# Patient Record
Sex: MALE | Race: WHITE | NOT HISPANIC OR LATINO | Employment: UNEMPLOYED | URBAN - NONMETROPOLITAN AREA
[De-identification: names, ages, dates, MRNs, and addresses within clinical notes are randomized per-mention and may not be internally consistent; named-entity substitution may affect disease eponyms.]

---

## 2022-12-22 ENCOUNTER — TELEPHONE (OUTPATIENT)
Dept: CARDIOLOGY | Facility: CLINIC | Age: 32
End: 2022-12-22

## 2022-12-22 NOTE — TELEPHONE ENCOUNTER
Called pt to reschedule the appointment they missed for today and was unable to reach them. The number we had on file was for a different doctor's office.       Called PCP's office, requested a better # for pt as the one we have is for a doctor's office. I informed them that pt no showed appt today and that we were trying to reach him. She stated she does not have a better # for pt. Confirmed that the address we have on file matches the one they have. I advised them that I would just send a no show letter, but to let p[t know if he calls them.     Will send a no show letter to pt and their PCP.

## 2023-02-24 ENCOUNTER — HOSPITAL ENCOUNTER (EMERGENCY)
Facility: HOSPITAL | Age: 33
Discharge: PSYCHIATRIC OR SUBSTANCE USE FACILITY | DRG: 897 | End: 2023-02-24
Attending: EMERGENCY MEDICINE | Admitting: EMERGENCY MEDICINE
Payer: MEDICAID

## 2023-02-24 ENCOUNTER — HOSPITAL ENCOUNTER (INPATIENT)
Facility: HOSPITAL | Age: 33
LOS: 5 days | Discharge: HOME | DRG: 897 | End: 2023-03-01
Attending: PSYCHIATRY & NEUROLOGY | Admitting: PSYCHIATRY & NEUROLOGY
Payer: MEDICAID

## 2023-02-24 VITALS
OXYGEN SATURATION: 98 % | BODY MASS INDEX: 26.51 KG/M2 | HEIGHT: 73 IN | TEMPERATURE: 98.6 F | RESPIRATION RATE: 18 BRPM | WEIGHT: 200 LBS | HEART RATE: 95 BPM | DIASTOLIC BLOOD PRESSURE: 88 MMHG | SYSTOLIC BLOOD PRESSURE: 130 MMHG

## 2023-02-24 DIAGNOSIS — F19.10 SUBSTANCE ABUSE: Primary | ICD-10-CM

## 2023-02-24 DIAGNOSIS — F10.10 ALCOHOL ABUSE: ICD-10-CM

## 2023-02-24 LAB
ALBUMIN SERPL-MCNC: 3.6 G/DL (ref 3.5–5.2)
ALBUMIN/GLOB SERPL: 0.7 G/DL
ALP SERPL-CCNC: 97 U/L (ref 39–117)
ALT SERPL W P-5'-P-CCNC: 44 U/L (ref 1–41)
AMPHET+METHAMPHET UR QL: POSITIVE
AMPHETAMINES UR QL: POSITIVE
ANION GAP SERPL CALCULATED.3IONS-SCNC: 10.7 MMOL/L (ref 5–15)
AST SERPL-CCNC: 64 U/L (ref 1–40)
BARBITURATES UR QL SCN: NEGATIVE
BASOPHILS # BLD AUTO: 0.02 10*3/MM3 (ref 0–0.2)
BASOPHILS NFR BLD AUTO: 0.6 % (ref 0–1.5)
BENZODIAZ UR QL SCN: NEGATIVE
BILIRUB SERPL-MCNC: 0.4 MG/DL (ref 0–1.2)
BILIRUB UR QL STRIP: NEGATIVE
BUN SERPL-MCNC: 12 MG/DL (ref 6–20)
BUN/CREAT SERPL: 18.8 (ref 7–25)
BUPRENORPHINE SERPL-MCNC: NEGATIVE NG/ML
CALCIUM SPEC-SCNC: 9.8 MG/DL (ref 8.6–10.5)
CANNABINOIDS SERPL QL: NEGATIVE
CHLORIDE SERPL-SCNC: 101 MMOL/L (ref 98–107)
CLARITY UR: CLEAR
CO2 SERPL-SCNC: 19.3 MMOL/L (ref 22–29)
COCAINE UR QL: NEGATIVE
COLOR UR: YELLOW
CREAT SERPL-MCNC: 0.64 MG/DL (ref 0.76–1.27)
DEPRECATED RDW RBC AUTO: 41.3 FL (ref 37–54)
EGFRCR SERPLBLD CKD-EPI 2021: 128.2 ML/MIN/1.73
EOSINOPHIL # BLD AUTO: 0.15 10*3/MM3 (ref 0–0.4)
EOSINOPHIL NFR BLD AUTO: 4.1 % (ref 0.3–6.2)
ERYTHROCYTE [DISTWIDTH] IN BLOOD BY AUTOMATED COUNT: 12.5 % (ref 12.3–15.4)
ETHANOL BLD-MCNC: <10 MG/DL (ref 0–10)
ETHANOL UR QL: <0.01 %
FLUAV RNA RESP QL NAA+PROBE: NOT DETECTED
FLUBV RNA RESP QL NAA+PROBE: NOT DETECTED
GLOBULIN UR ELPH-MCNC: 5 GM/DL
GLUCOSE SERPL-MCNC: 112 MG/DL (ref 65–99)
GLUCOSE UR STRIP-MCNC: NEGATIVE MG/DL
HCT VFR BLD AUTO: 45.9 % (ref 37.5–51)
HGB BLD-MCNC: 14.8 G/DL (ref 13–17.7)
HGB UR QL STRIP.AUTO: NEGATIVE
IMM GRANULOCYTES # BLD AUTO: 0.01 10*3/MM3 (ref 0–0.05)
IMM GRANULOCYTES NFR BLD AUTO: 0.3 % (ref 0–0.5)
KETONES UR QL STRIP: NEGATIVE
LEUKOCYTE ESTERASE UR QL STRIP.AUTO: NEGATIVE
LYMPHOCYTES # BLD AUTO: 1.07 10*3/MM3 (ref 0.7–3.1)
LYMPHOCYTES NFR BLD AUTO: 29.6 % (ref 19.6–45.3)
MAGNESIUM SERPL-MCNC: 2 MG/DL (ref 1.6–2.6)
MCH RBC QN AUTO: 29 PG (ref 26.6–33)
MCHC RBC AUTO-ENTMCNC: 32.2 G/DL (ref 31.5–35.7)
MCV RBC AUTO: 90 FL (ref 79–97)
METHADONE UR QL SCN: NEGATIVE
MONOCYTES # BLD AUTO: 0.26 10*3/MM3 (ref 0.1–0.9)
MONOCYTES NFR BLD AUTO: 7.2 % (ref 5–12)
NEUTROPHILS NFR BLD AUTO: 2.11 10*3/MM3 (ref 1.7–7)
NEUTROPHILS NFR BLD AUTO: 58.2 % (ref 42.7–76)
NITRITE UR QL STRIP: NEGATIVE
NRBC BLD AUTO-RTO: 0 /100 WBC (ref 0–0.2)
OPIATES UR QL: NEGATIVE
OXYCODONE UR QL SCN: NEGATIVE
PCP UR QL SCN: NEGATIVE
PH UR STRIP.AUTO: 8.5 [PH] (ref 5–8)
PLATELET # BLD AUTO: 202 10*3/MM3 (ref 140–450)
PMV BLD AUTO: 9.7 FL (ref 6–12)
POTASSIUM SERPL-SCNC: 5.1 MMOL/L (ref 3.5–5.2)
PROPOXYPH UR QL: NEGATIVE
PROT SERPL-MCNC: 8.6 G/DL (ref 6–8.5)
PROT UR QL STRIP: NEGATIVE
RBC # BLD AUTO: 5.1 10*6/MM3 (ref 4.14–5.8)
SARS-COV-2 RNA RESP QL NAA+PROBE: NOT DETECTED
SODIUM SERPL-SCNC: 131 MMOL/L (ref 136–145)
SP GR UR STRIP: 1.01 (ref 1–1.03)
TRICYCLICS UR QL SCN: NEGATIVE
UROBILINOGEN UR QL STRIP: ABNORMAL
WBC NRBC COR # BLD: 3.62 10*3/MM3 (ref 3.4–10.8)

## 2023-02-24 PROCEDURE — 93005 ELECTROCARDIOGRAM TRACING: CPT | Performed by: PSYCHIATRY & NEUROLOGY

## 2023-02-24 PROCEDURE — 80306 DRUG TEST PRSMV INSTRMNT: CPT | Performed by: EMERGENCY MEDICINE

## 2023-02-24 PROCEDURE — 87636 SARSCOV2 & INF A&B AMP PRB: CPT | Performed by: EMERGENCY MEDICINE

## 2023-02-24 PROCEDURE — 83735 ASSAY OF MAGNESIUM: CPT | Performed by: EMERGENCY MEDICINE

## 2023-02-24 PROCEDURE — 81003 URINALYSIS AUTO W/O SCOPE: CPT | Performed by: EMERGENCY MEDICINE

## 2023-02-24 PROCEDURE — 99285 EMERGENCY DEPT VISIT HI MDM: CPT

## 2023-02-24 PROCEDURE — 36415 COLL VENOUS BLD VENIPUNCTURE: CPT

## 2023-02-24 PROCEDURE — 63710000001 ONDANSETRON PER 8 MG: Performed by: PSYCHIATRY & NEUROLOGY

## 2023-02-24 PROCEDURE — 82077 ASSAY SPEC XCP UR&BREATH IA: CPT | Performed by: EMERGENCY MEDICINE

## 2023-02-24 PROCEDURE — 93010 ELECTROCARDIOGRAM REPORT: CPT | Performed by: INTERNAL MEDICINE

## 2023-02-24 PROCEDURE — 85025 COMPLETE CBC W/AUTO DIFF WBC: CPT | Performed by: EMERGENCY MEDICINE

## 2023-02-24 PROCEDURE — C9803 HOPD COVID-19 SPEC COLLECT: HCPCS

## 2023-02-24 PROCEDURE — 80053 COMPREHEN METABOLIC PANEL: CPT | Performed by: EMERGENCY MEDICINE

## 2023-02-24 RX ORDER — LORAZEPAM 0.5 MG/1
0.5 TABLET ORAL EVERY 4 HOURS PRN
Status: ACTIVE | OUTPATIENT
Start: 2023-02-28 | End: 2023-03-01

## 2023-02-24 RX ORDER — MULTIPLE VITAMINS W/ MINERALS TAB 9MG-400MCG
1 TAB ORAL DAILY
Status: DISCONTINUED | OUTPATIENT
Start: 2023-02-24 | End: 2023-03-01 | Stop reason: HOSPADM

## 2023-02-24 RX ORDER — LORAZEPAM 2 MG/1
2 TABLET ORAL EVERY 4 HOURS PRN
Status: ACTIVE | OUTPATIENT
Start: 2023-02-25 | End: 2023-02-26

## 2023-02-24 RX ORDER — ECHINACEA PURPUREA EXTRACT 125 MG
2 TABLET ORAL AS NEEDED
Status: DISCONTINUED | OUTPATIENT
Start: 2023-02-24 | End: 2023-03-01 | Stop reason: HOSPADM

## 2023-02-24 RX ORDER — FAMOTIDINE 20 MG/1
20 TABLET, FILM COATED ORAL 2 TIMES DAILY PRN
Status: DISCONTINUED | OUTPATIENT
Start: 2023-02-24 | End: 2023-03-01 | Stop reason: HOSPADM

## 2023-02-24 RX ORDER — LORAZEPAM 1 MG/1
1 TABLET ORAL
Status: COMPLETED | OUTPATIENT
Start: 2023-02-27 | End: 2023-02-27

## 2023-02-24 RX ORDER — HYDROXYZINE HYDROCHLORIDE 25 MG/1
50 TABLET, FILM COATED ORAL EVERY 6 HOURS PRN
Status: DISCONTINUED | OUTPATIENT
Start: 2023-02-24 | End: 2023-03-01 | Stop reason: HOSPADM

## 2023-02-24 RX ORDER — HYDRALAZINE HYDROCHLORIDE 25 MG/1
25 TABLET, FILM COATED ORAL DAILY PRN
Status: DISCONTINUED | OUTPATIENT
Start: 2023-02-24 | End: 2023-03-01 | Stop reason: HOSPADM

## 2023-02-24 RX ORDER — CLONIDINE HYDROCHLORIDE 0.1 MG/1
0.1 TABLET ORAL 2 TIMES DAILY PRN
Status: ACTIVE | OUTPATIENT
Start: 2023-02-27 | End: 2023-02-28

## 2023-02-24 RX ORDER — CLONIDINE HYDROCHLORIDE 0.1 MG/1
0.1 TABLET ORAL 4 TIMES DAILY PRN
Status: DISPENSED | OUTPATIENT
Start: 2023-02-24 | End: 2023-02-25

## 2023-02-24 RX ORDER — QUETIAPINE FUMARATE 100 MG/1
100 TABLET, FILM COATED ORAL NIGHTLY
Status: DISCONTINUED | OUTPATIENT
Start: 2023-02-24 | End: 2023-03-01 | Stop reason: HOSPADM

## 2023-02-24 RX ORDER — DICYCLOMINE HYDROCHLORIDE 10 MG/1
10 CAPSULE ORAL 3 TIMES DAILY PRN
Status: DISCONTINUED | OUTPATIENT
Start: 2023-02-24 | End: 2023-03-01 | Stop reason: HOSPADM

## 2023-02-24 RX ORDER — CYCLOBENZAPRINE HCL 10 MG
10 TABLET ORAL 3 TIMES DAILY PRN
Status: DISCONTINUED | OUTPATIENT
Start: 2023-02-24 | End: 2023-03-01 | Stop reason: HOSPADM

## 2023-02-24 RX ORDER — BENZTROPINE MESYLATE 1 MG/1
2 TABLET ORAL ONCE AS NEEDED
Status: DISCONTINUED | OUTPATIENT
Start: 2023-02-24 | End: 2023-03-01 | Stop reason: HOSPADM

## 2023-02-24 RX ORDER — LORAZEPAM 1 MG/1
1 TABLET ORAL EVERY 4 HOURS PRN
Status: ACTIVE | OUTPATIENT
Start: 2023-02-27 | End: 2023-02-28

## 2023-02-24 RX ORDER — BENZONATATE 100 MG/1
100 CAPSULE ORAL 3 TIMES DAILY PRN
Status: DISCONTINUED | OUTPATIENT
Start: 2023-02-24 | End: 2023-03-01 | Stop reason: HOSPADM

## 2023-02-24 RX ORDER — NICOTINE 21 MG/24HR
1 PATCH, TRANSDERMAL 24 HOURS TRANSDERMAL
Status: DISCONTINUED | OUTPATIENT
Start: 2023-02-24 | End: 2023-03-01 | Stop reason: HOSPADM

## 2023-02-24 RX ORDER — LORAZEPAM 0.5 MG/1
0.5 TABLET ORAL
Status: COMPLETED | OUTPATIENT
Start: 2023-02-28 | End: 2023-02-28

## 2023-02-24 RX ORDER — MULTIVITAMIN WITH IRON
2 TABLET ORAL DAILY
Status: DISCONTINUED | OUTPATIENT
Start: 2023-02-24 | End: 2023-03-01 | Stop reason: HOSPADM

## 2023-02-24 RX ORDER — ALUMINA, MAGNESIA, AND SIMETHICONE 2400; 2400; 240 MG/30ML; MG/30ML; MG/30ML
15 SUSPENSION ORAL EVERY 6 HOURS PRN
Status: DISCONTINUED | OUTPATIENT
Start: 2023-02-24 | End: 2023-03-01 | Stop reason: HOSPADM

## 2023-02-24 RX ORDER — CLONIDINE HYDROCHLORIDE 0.1 MG/1
0.1 TABLET ORAL ONCE AS NEEDED
Status: ACTIVE | OUTPATIENT
Start: 2023-02-28 | End: 2023-03-01

## 2023-02-24 RX ORDER — ONDANSETRON 4 MG/1
4 TABLET, FILM COATED ORAL EVERY 6 HOURS PRN
Status: DISCONTINUED | OUTPATIENT
Start: 2023-02-24 | End: 2023-03-01 | Stop reason: HOSPADM

## 2023-02-24 RX ORDER — LOPERAMIDE HYDROCHLORIDE 2 MG/1
2 CAPSULE ORAL
Status: DISCONTINUED | OUTPATIENT
Start: 2023-02-24 | End: 2023-03-01 | Stop reason: HOSPADM

## 2023-02-24 RX ORDER — TRAZODONE HYDROCHLORIDE 50 MG/1
50 TABLET ORAL NIGHTLY PRN
Status: DISCONTINUED | OUTPATIENT
Start: 2023-02-24 | End: 2023-03-01 | Stop reason: HOSPADM

## 2023-02-24 RX ORDER — QUETIAPINE FUMARATE 50 MG/1
100 TABLET, FILM COATED ORAL NIGHTLY
COMMUNITY

## 2023-02-24 RX ORDER — LORAZEPAM 2 MG/1
2 TABLET ORAL
Status: COMPLETED | OUTPATIENT
Start: 2023-02-25 | End: 2023-02-25

## 2023-02-24 RX ORDER — LAMOTRIGINE 25 MG/1
25 TABLET ORAL DAILY
COMMUNITY

## 2023-02-24 RX ORDER — IBUPROFEN 400 MG/1
400 TABLET ORAL EVERY 6 HOURS PRN
Status: DISCONTINUED | OUTPATIENT
Start: 2023-02-24 | End: 2023-03-01 | Stop reason: HOSPADM

## 2023-02-24 RX ORDER — VELPATASVIR AND SOFOSBUVIR 100; 400 MG/1; MG/1
1 TABLET, FILM COATED ORAL DAILY
COMMUNITY
Start: 2023-01-27

## 2023-02-24 RX ORDER — BENZTROPINE MESYLATE 1 MG/ML
1 INJECTION INTRAMUSCULAR; INTRAVENOUS ONCE AS NEEDED
Status: DISCONTINUED | OUTPATIENT
Start: 2023-02-24 | End: 2023-03-01 | Stop reason: HOSPADM

## 2023-02-24 RX ORDER — LAMOTRIGINE 100 MG/1
25 TABLET ORAL DAILY
Status: DISCONTINUED | OUTPATIENT
Start: 2023-02-24 | End: 2023-03-01 | Stop reason: HOSPADM

## 2023-02-24 RX ORDER — CLONIDINE HYDROCHLORIDE 0.1 MG/1
0.1 TABLET ORAL 4 TIMES DAILY PRN
Status: ACTIVE | OUTPATIENT
Start: 2023-02-25 | End: 2023-02-26

## 2023-02-24 RX ORDER — LORAZEPAM 2 MG/1
2 TABLET ORAL
Status: DISPENSED | OUTPATIENT
Start: 2023-02-24 | End: 2023-02-25

## 2023-02-24 RX ORDER — CLONIDINE HYDROCHLORIDE 0.1 MG/1
0.1 TABLET ORAL 3 TIMES DAILY PRN
Status: ACTIVE | OUTPATIENT
Start: 2023-02-26 | End: 2023-02-27

## 2023-02-24 RX ADMIN — DICYCLOMINE HYDROCHLORIDE 10 MG: 10 CAPSULE ORAL at 16:33

## 2023-02-24 RX ADMIN — ONDANSETRON HYDROCHLORIDE 4 MG: 4 TABLET, FILM COATED ORAL at 16:33

## 2023-02-24 RX ADMIN — Medication 2 TABLET: at 16:34

## 2023-02-24 RX ADMIN — LORAZEPAM 2 MG: 2 TABLET ORAL at 16:34

## 2023-02-24 RX ADMIN — Medication 100 MG: at 16:33

## 2023-02-24 RX ADMIN — LAMOTRIGINE 25 MG: 100 TABLET ORAL at 17:52

## 2023-02-24 RX ADMIN — HYDROXYZINE HYDROCHLORIDE 50 MG: 25 TABLET ORAL at 16:33

## 2023-02-24 RX ADMIN — Medication 1 TABLET: at 17:52

## 2023-02-24 RX ADMIN — CYCLOBENZAPRINE 10 MG: 10 TABLET, FILM COATED ORAL at 16:33

## 2023-02-24 RX ADMIN — IBUPROFEN 400 MG: 400 TABLET, FILM COATED ORAL at 16:33

## 2023-02-24 RX ADMIN — CLONIDINE HYDROCHLORIDE 0.1 MG: 0.1 TABLET ORAL at 16:33

## 2023-02-25 PROBLEM — F15.20: Status: ACTIVE | Noted: 2023-02-25

## 2023-02-25 PROBLEM — F10.20 ALCOHOL USE DISORDER, SEVERE, DEPENDENCE: Status: ACTIVE | Noted: 2023-02-25

## 2023-02-25 PROCEDURE — 99223 1ST HOSP IP/OBS HIGH 75: CPT | Performed by: PSYCHIATRY & NEUROLOGY

## 2023-02-25 PROCEDURE — 63710000001 ONDANSETRON PER 8 MG: Performed by: PSYCHIATRY & NEUROLOGY

## 2023-02-25 RX ADMIN — LAMOTRIGINE 25 MG: 100 TABLET ORAL at 10:52

## 2023-02-25 RX ADMIN — LORAZEPAM 2 MG: 2 TABLET ORAL at 14:49

## 2023-02-25 RX ADMIN — QUETIAPINE FUMARATE 100 MG: 100 TABLET ORAL at 21:26

## 2023-02-25 RX ADMIN — DICYCLOMINE HYDROCHLORIDE 10 MG: 10 CAPSULE ORAL at 09:15

## 2023-02-25 RX ADMIN — Medication 100 MG: at 09:15

## 2023-02-25 RX ADMIN — ONDANSETRON HYDROCHLORIDE 4 MG: 4 TABLET, FILM COATED ORAL at 09:16

## 2023-02-25 RX ADMIN — Medication 1 TABLET: at 10:53

## 2023-02-25 RX ADMIN — LORAZEPAM 2 MG: 2 TABLET ORAL at 21:26

## 2023-02-25 RX ADMIN — IBUPROFEN 400 MG: 400 TABLET, FILM COATED ORAL at 09:15

## 2023-02-25 RX ADMIN — CYCLOBENZAPRINE 10 MG: 10 TABLET, FILM COATED ORAL at 09:15

## 2023-02-25 RX ADMIN — LORAZEPAM 2 MG: 2 TABLET ORAL at 09:15

## 2023-02-25 RX ADMIN — Medication 2 TABLET: at 09:16

## 2023-02-26 LAB
QT INTERVAL: 400 MS
QTC INTERVAL: 428 MS

## 2023-02-26 PROCEDURE — 99232 SBSQ HOSP IP/OBS MODERATE 35: CPT | Performed by: PSYCHIATRY & NEUROLOGY

## 2023-02-26 RX ADMIN — Medication 100 MG: at 08:29

## 2023-02-26 RX ADMIN — LORAZEPAM 1.5 MG: 1 TABLET ORAL at 08:29

## 2023-02-26 RX ADMIN — HYDROXYZINE HYDROCHLORIDE 50 MG: 25 TABLET ORAL at 08:29

## 2023-02-26 RX ADMIN — LAMOTRIGINE 25 MG: 100 TABLET ORAL at 08:29

## 2023-02-26 RX ADMIN — CYCLOBENZAPRINE 10 MG: 10 TABLET, FILM COATED ORAL at 22:16

## 2023-02-26 RX ADMIN — QUETIAPINE FUMARATE 100 MG: 100 TABLET ORAL at 22:16

## 2023-02-26 RX ADMIN — LORAZEPAM 1.5 MG: 1 TABLET ORAL at 22:16

## 2023-02-26 RX ADMIN — Medication 2 TABLET: at 08:29

## 2023-02-26 RX ADMIN — LORAZEPAM 1.5 MG: 1 TABLET ORAL at 14:18

## 2023-02-26 RX ADMIN — Medication 1 TABLET: at 08:29

## 2023-02-27 PROBLEM — F11.20 OPIOID USE DISORDER, SEVERE, DEPENDENCE: Status: ACTIVE | Noted: 2023-02-27

## 2023-02-27 PROCEDURE — 99232 SBSQ HOSP IP/OBS MODERATE 35: CPT | Performed by: PSYCHIATRY & NEUROLOGY

## 2023-02-27 RX ORDER — BUPRENORPHINE 2 MG/1
2 TABLET SUBLINGUAL DAILY
Status: DISCONTINUED | OUTPATIENT
Start: 2023-03-01 | End: 2023-02-28

## 2023-02-27 RX ORDER — BUPRENORPHINE 2 MG/1
2 TABLET SUBLINGUAL 2 TIMES DAILY
Status: COMPLETED | OUTPATIENT
Start: 2023-02-28 | End: 2023-02-28

## 2023-02-27 RX ORDER — BUPRENORPHINE 2 MG/1
2 TABLET SUBLINGUAL 2 TIMES DAILY
Status: COMPLETED | OUTPATIENT
Start: 2023-02-27 | End: 2023-02-27

## 2023-02-27 RX ADMIN — BUPRENORPHINE HCL 2 MG: 2 TABLET SUBLINGUAL at 15:06

## 2023-02-27 RX ADMIN — HYDROXYZINE HYDROCHLORIDE 50 MG: 25 TABLET ORAL at 08:35

## 2023-02-27 RX ADMIN — LORAZEPAM 1 MG: 1 TABLET ORAL at 21:10

## 2023-02-27 RX ADMIN — LORAZEPAM 1 MG: 1 TABLET ORAL at 15:06

## 2023-02-27 RX ADMIN — Medication 2 TABLET: at 08:35

## 2023-02-27 RX ADMIN — LAMOTRIGINE 25 MG: 100 TABLET ORAL at 08:35

## 2023-02-27 RX ADMIN — LORAZEPAM 1 MG: 1 TABLET ORAL at 08:35

## 2023-02-27 RX ADMIN — CYCLOBENZAPRINE 10 MG: 10 TABLET, FILM COATED ORAL at 08:35

## 2023-02-27 RX ADMIN — QUETIAPINE FUMARATE 100 MG: 100 TABLET ORAL at 21:10

## 2023-02-27 RX ADMIN — Medication 100 MG: at 08:35

## 2023-02-27 RX ADMIN — Medication 1 TABLET: at 08:35

## 2023-02-27 RX ADMIN — BUPRENORPHINE HCL 2 MG: 2 TABLET SUBLINGUAL at 21:10

## 2023-02-28 PROCEDURE — 99232 SBSQ HOSP IP/OBS MODERATE 35: CPT | Performed by: PSYCHIATRY & NEUROLOGY

## 2023-02-28 RX ADMIN — LORAZEPAM 0.5 MG: 0.5 TABLET ORAL at 14:17

## 2023-02-28 RX ADMIN — LORAZEPAM 0.5 MG: 0.5 TABLET ORAL at 08:31

## 2023-02-28 RX ADMIN — CYCLOBENZAPRINE 10 MG: 10 TABLET, FILM COATED ORAL at 14:19

## 2023-02-28 RX ADMIN — Medication 2 TABLET: at 08:29

## 2023-02-28 RX ADMIN — LAMOTRIGINE 25 MG: 100 TABLET ORAL at 08:29

## 2023-02-28 RX ADMIN — BUPRENORPHINE HCL 2 MG: 2 TABLET SUBLINGUAL at 21:38

## 2023-02-28 RX ADMIN — BUPRENORPHINE HCL 2 MG: 2 TABLET SUBLINGUAL at 08:29

## 2023-02-28 RX ADMIN — QUETIAPINE FUMARATE 100 MG: 100 TABLET ORAL at 21:38

## 2023-02-28 RX ADMIN — LORAZEPAM 0.5 MG: 0.5 TABLET ORAL at 21:38

## 2023-02-28 RX ADMIN — Medication 1 TABLET: at 08:29

## 2023-02-28 RX ADMIN — HYDROXYZINE HYDROCHLORIDE 50 MG: 25 TABLET ORAL at 14:19

## 2023-02-28 RX ADMIN — IBUPROFEN 400 MG: 400 TABLET, FILM COATED ORAL at 14:19

## 2023-02-28 RX ADMIN — Medication 100 MG: at 08:29

## 2023-03-01 VITALS
OXYGEN SATURATION: 99 % | TEMPERATURE: 97.6 F | DIASTOLIC BLOOD PRESSURE: 56 MMHG | WEIGHT: 192.4 LBS | HEIGHT: 73 IN | BODY MASS INDEX: 25.5 KG/M2 | RESPIRATION RATE: 18 BRPM | SYSTOLIC BLOOD PRESSURE: 91 MMHG | HEART RATE: 88 BPM

## 2023-03-01 PROCEDURE — 99238 HOSP IP/OBS DSCHRG MGMT 30/<: CPT | Performed by: PSYCHIATRY & NEUROLOGY

## 2023-03-01 RX ADMIN — HYDROXYZINE HYDROCHLORIDE 50 MG: 25 TABLET ORAL at 08:40

## 2023-03-01 RX ADMIN — Medication 2 TABLET: at 08:40

## 2023-03-01 RX ADMIN — CYCLOBENZAPRINE 10 MG: 10 TABLET, FILM COATED ORAL at 08:40

## 2023-03-01 RX ADMIN — LAMOTRIGINE 25 MG: 100 TABLET ORAL at 08:40

## 2023-03-01 RX ADMIN — Medication 100 MG: at 08:40

## 2023-03-01 RX ADMIN — Medication 1 TABLET: at 08:39

## 2023-03-01 NOTE — DISCHARGE SUMMARY
":  1990  MRN:  [id number]  Visit Number:  [id number]      Date of Admission:2023   Date of Discharge:  3/1/2023    Discharge Diagnosis:  Principal Problem:    Alcohol use disorder, severe, dependence (HCC)  Active Problems:    Severe methamphetamine use disorder (HCC)    Opioid use disorder, severe, dependence (HCC)    Mood disorder (HCC)    HIV (human immunodeficiency virus infection) (HCC)    Hepatitis C        Admission Diagnosis:  Polysubstance abuse (HCC) [F19.10]     HPI  Mario Vergara is a 32 y.o. male who was admitted on 2023 with complaints of drug use and withdrawals.  For details please see H&P dated 23.    Hospital Course  Patient is a 32 y.o. male presented with polysubstance use including alcohol, methamphetamine and opioids. The patient was admitted to the detox recovery unit and started on Ativan detox along with clonidine detox. He reported ongoing opioid withdrawals and a short Subutex detox was added.   The patient was continued on his home medications. He reported that he had a diagnosis of HIV and Hep C and was in treatment. No pharmacy record could be found but he reported he was prescribed Biktarvy and last prescription was two months ago in [place] but he didn't pick it up. He was encouraged to resume the medication along with his Hep C treatment and continue his clinic follow-ups. He was able to complete the detox without any adverse events and was discharged back to rehab on 3/1/23.       Mental Status Exam upon discharge:   Mood \"anxious\"   Affect-congruent, appropriate, stable  Thought Content-goal directed, no delusional material present  Thought process-linear, organized.  Suicidality: No SI  Homicidality: No HI  Perception: No AH/VH    Procedures Performed         Consults:   Consults     No orders found from 2023 to 2023.          Pertinent Test Results:   Admission on 2023   Component Date Value Ref Range Status   • QT Interval 2023 400  " ms Final   • QTC Interval 02/24/2023 428  ms Final   Admission on 02/24/2023, Discharged on 02/24/2023   Component Date Value Ref Range Status   • Glucose 02/24/2023 112 (H)  65 - 99 mg/dL Final   • BUN 02/24/2023 12  6 - 20 mg/dL Final   • Creatinine 02/24/2023 0.64 (L)  0.76 - 1.27 mg/dL Final   • Sodium 02/24/2023 131 (L)  136 - 145 mmol/L Final   • Potassium 02/24/2023 5.1  3.5 - 5.2 mmol/L Final    Specimen hemolyzed.  Results may be affected. 1+ Hemolysis       • Chloride 02/24/2023 101  98 - 107 mmol/L Final   • CO2 02/24/2023 19.3 (L)  22.0 - 29.0 mmol/L Final   • Calcium 02/24/2023 9.8  8.6 - 10.5 mg/dL Final   • Total Protein 02/24/2023 8.6 (H)  6.0 - 8.5 g/dL Final   • Albumin 02/24/2023 3.6  3.5 - 5.2 g/dL Final   • ALT (SGPT) 02/24/2023 44 (H)  1 - 41 U/L Final    Specimen hemolyzed.  Results may be affected.   • AST (SGOT) 02/24/2023 64 (H)  1 - 40 U/L Final   • Alkaline Phosphatase 02/24/2023 97  39 - 117 U/L Final   • Total Bilirubin 02/24/2023 0.4  0.0 - 1.2 mg/dL Final   • Globulin 02/24/2023 5.0  gm/dL Final   • A/G Ratio 02/24/2023 0.7  g/dL Final   • BUN/Creatinine Ratio 02/24/2023 18.8  7.0 - 25.0 Final   • Anion Gap 02/24/2023 10.7  5.0 - 15.0 mmol/L Final   • eGFR 02/24/2023 128.2  >60.0 mL/min/1.73 Final   • Color, UA 02/24/2023 Yellow  Yellow, Straw Final   • Appearance, UA 02/24/2023 Clear  Clear Final   • pH, UA 02/24/2023 8.5 (H)  5.0 - 8.0 Final   • Specific Gravity, UA 02/24/2023 1.007  1.005 - 1.030 Final   • Glucose, UA 02/24/2023 Negative  Negative Final   • Ketones, UA 02/24/2023 Negative  Negative Final   • Bilirubin, UA 02/24/2023 Negative  Negative Final   • Blood, UA 02/24/2023 Negative  Negative Final   • Protein, UA 02/24/2023 Negative  Negative Final   • Leuk Esterase, UA 02/24/2023 Negative  Negative Final   • Nitrite, UA 02/24/2023 Negative  Negative Final   • Urobilinogen, UA 02/24/2023 0.2 E.U./dL  0.2 - 1.0 E.U./dL Final   • THC, Screen, Urine 02/24/2023 Negative   Negative Final   • Phencyclidine (PCP), Urine 02/24/2023 Negative  Negative Final   • Cocaine Screen, Urine 02/24/2023 Negative  Negative Final   • Methamphetamine, Ur 02/24/2023 Positive (A)  Negative Final   • Opiate Screen 02/24/2023 Negative  Negative Final   • Amphetamine Screen, Urine 02/24/2023 Positive (A)  Negative Final   • Benzodiazepine Screen, Urine 02/24/2023 Negative  Negative Final   • Tricyclic Antidepressants Screen 02/24/2023 Negative  Negative Final   • Methadone Screen, Urine 02/24/2023 Negative  Negative Final   • Barbiturates Screen, Urine 02/24/2023 Negative  Negative Final   • Oxycodone Screen, Urine 02/24/2023 Negative  Negative Final   • Propoxyphene Screen 02/24/2023 Negative  Negative Final   • Buprenorphine, Screen, Urine 02/24/2023 Negative  Negative Final   • Magnesium 02/24/2023 2.0  1.6 - 2.6 mg/dL Final   • Ethanol 02/24/2023 <10  0 - 10 mg/dL Final   • Ethanol % 02/24/2023 <0.010  % Final   • WBC 02/24/2023 3.62  3.40 - 10.80 10*3/mm3 Final   • RBC 02/24/2023 5.10  4.14 - 5.80 10*6/mm3 Final   • Hemoglobin 02/24/2023 14.8  13.0 - 17.7 g/dL Final   • Hematocrit 02/24/2023 45.9  37.5 - 51.0 % Final   • MCV 02/24/2023 90.0  79.0 - 97.0 fL Final   • MCH 02/24/2023 29.0  26.6 - 33.0 pg Final   • MCHC 02/24/2023 32.2  31.5 - 35.7 g/dL Final   • RDW 02/24/2023 12.5  12.3 - 15.4 % Final   • RDW-SD 02/24/2023 41.3  37.0 - 54.0 fl Final   • MPV 02/24/2023 9.7  6.0 - 12.0 fL Final   • Platelets 02/24/2023 202  140 - 450 10*3/mm3 Final   • Neutrophil % 02/24/2023 58.2  42.7 - 76.0 % Final   • Lymphocyte % 02/24/2023 29.6  19.6 - 45.3 % Final   • Monocyte % 02/24/2023 7.2  5.0 - 12.0 % Final   • Eosinophil % 02/24/2023 4.1  0.3 - 6.2 % Final   • Basophil % 02/24/2023 0.6  0.0 - 1.5 % Final   • Immature Grans % 02/24/2023 0.3  0.0 - 0.5 % Final   • Neutrophils, Absolute 02/24/2023 2.11  1.70 - 7.00 10*3/mm3 Final   • Lymphocytes, Absolute 02/24/2023 1.07  0.70 - 3.10 10*3/mm3 Final   •  Monocytes, Absolute 02/24/2023 0.26  0.10 - 0.90 10*3/mm3 Final   • Eosinophils, Absolute 02/24/2023 0.15  0.00 - 0.40 10*3/mm3 Final   • Basophils, Absolute 02/24/2023 0.02  0.00 - 0.20 10*3/mm3 Final   • Immature Grans, Absolute 02/24/2023 0.01  0.00 - 0.05 10*3/mm3 Final   • nRBC 02/24/2023 0.0  0.0 - 0.2 /100 WBC Final   • COVID19 02/24/2023 Not Detected  Not Detected - Ref. Range Final   • Influenza A PCR 02/24/2023 Not Detected  Not Detected Final   • Influenza B PCR 02/24/2023 Not Detected  Not Detected Final        Condition on Discharge:  improved    Vital Signs  Temp:  [97.2 °F (36.2 °C)-98.1 °F (36.7 °C)] 97.6 °F (36.4 °C)  Heart Rate:  [] 84  Resp:  [16-18] 16  BP: ()/(54-74) 95/54      Discharge Disposition:  Home or Self Care    Discharge Medications:     Discharge Medications      Continue These Medications      Instructions Start Date   lamoTRIgine 25 MG tablet  Commonly known as: LaMICtal   25 mg, Oral, Daily      QUEtiapine 50 MG tablet  Commonly known as: SEROquel   100 mg, Oral, Nightly      Sofosbuvir-Velpatasvir 400-100 MG tablet   1 tablet, Oral, Daily             Discharge Diet: Regular     Activity at Discharge: As tolerated     Follow-up Appointments     [address] [address] [phone]       Time spent in discharge: < 30 min    Clinician:   Gian Mcmullen MD  03/01/23  12:11 EST

## 2023-03-01 NOTE — PLAN OF CARE
Goal Outcome Evaluation:  Plan of Care Reviewed With: patient  Patient Agreement with Plan of Care: agrees     Progress: improving  Outcome Evaluation: Patient was tolerant but cooperative of my assessment.  He rated anxiety as a 6 and depression as a 6.  He reported appetite as 'ok' and was sleeping good.  Denied SI/HI. Reports tactile hallucinations described as tingling on his arms.

## 2023-03-01 NOTE — CASE MANAGEMENT/SOCIAL WORK
Case Management/Social Work    Patient Name:  Mario Vergara  YOB: 1990  MRN: [id number]  Admit Date:  2/24/2023    Patient is being discharged on this date. Patient was supposed to return to [facility], facility had been agreeable for patient to return when contacted upon admission. Therapist contacted [facility] again on day of discharge to arrange transportation and spoke with Arianna who states they will not be able to take patient back now because all of their beds are full. Therapist informed patient of this change, irritable and upset by the situation. Therapist offered to try other rehab placement options, patient declines. Patient requesting transportation to go to [facility] to  personal belongings and then to be taken on to [place]. Therapist arranged transportation via [facility] and faxed discharge order to [phone], [facility] agreeable to take patient by [facility] and then home to [place]. Patient denies current SI/HI/AVH. Safety planning has been completed with patient. Therapist has assisted patient in identifying risk factors which would indicate the need for higher level of care including thoughts to harm self or others and/or self-harming behavior. Encouraged patient to call 911 or present to the nearest emergency room should any of these events occur. Discussed crisis intervention services and means to access.     [facility] ETA 15:00     Electronically signed by:  GUNNAR Alcantar  03/01/23 13:24 EST

## 2023-03-01 NOTE — DISCHARGE PLACEMENT REQUEST
"Samson Vergara (32 y.o. Male)     Date of Birth   1990    Social Security Number       Address   [address] [address]    Home Phone   [phone]    MRN   [id number]       Yazidism   Unknown    Marital Status                               Admission Date   2/24/23    Admission Type   Emergency    Admitting Provider   Gian Gibbons MD    Attending Provider   Gian Gibbons MD    Department, Room/Bed   [facility] ADULT CD, 1039/1S       Discharge Date       Discharge Disposition   Home or Self Care    Discharge Destination                               Attending Provider: Gian Gibbons MD    Allergies: No Known Allergies    Isolation: None   Infection: None   Code Status: CPR    Ht: 185.4 cm (73\")   Wt: 87.3 kg (192 lb 6.4 oz)    Admission Cmt: None   Principal Problem: Alcohol use disorder, severe, dependence (HCC) [F10.20]                 Active Insurance as of 2/24/2023     Primary Coverage     Payor Plan Insurance Group Employer/Plan Group    [facility] COMMUNITY PLAN [facility] COMMUNITY PLAN [facility]     Payor Plan Address Payor Plan Phone Number Payor Plan Fax Number Effective Dates    [address]   1/1/2023 - None Entered    [facility] [phone]       Subscriber Name Subscriber Birth Date Member ID       SAMSON VERGARA 1990 [id number]                 Emergency Contacts      (Rel.) Home Phone Work Phone Mobile Phone    BRIGIDO BOLANOS (Father) [phone] -- --            Discharge Order (From admission, onward)     Start     Ordered    03/01/23 1206  Discharge patient  Once        Expected Discharge Date: 03/01/23    Discharge Disposition: Home or Self Care    Physician of Record for Attribution - Please select from Treatment Team: GIAN GIBBONS [[id number]]    Review needed by CMO to determine Physician of Record: No       Question Answer Comment   Physician of Record for Attribution - Please select from Treatment Team GIAN GIBBONS    Review needed by CMO to " determine Physician of Record No        03/01/23 3538